# Patient Record
Sex: MALE | Race: WHITE | NOT HISPANIC OR LATINO | Employment: FULL TIME | URBAN - METROPOLITAN AREA
[De-identification: names, ages, dates, MRNs, and addresses within clinical notes are randomized per-mention and may not be internally consistent; named-entity substitution may affect disease eponyms.]

---

## 2024-01-16 NOTE — PROGRESS NOTES
Assessment & Plan   Problem List Items Addressed This Visit    None  Visit Diagnoses       Atypical chest pain    -  Primary    Relevant Orders    Lipid Profile (Chol, Trig, HDL, LDL calc)    EKG 12-lead complete w/read - (Clinic Performed)    Magnesium    NM MPI Treadmill    Screening for hyperlipidemia        Relevant Orders    Lipid Profile (Chol, Trig, HDL, LDL calc)    2nd degree AV block        Relevant Orders    EKG 12-lead complete w/read - (Clinic Performed)             Review of prior external note(s) from - Outside records from [facility]  30 minutes spent by me on the date of the encounter doing chart review, review of outside records, review of test results, interpretation of tests, patient visit, and documentation            Subjective   Blade is a 38 year old, presenting for the following health issues:  Establish Care      HPI     Blade is a 38-year-old healthy male who presents today due to chest pain.  He states that on New Year's naresh he had to get under his home because a pipe broke.  With fixing this he had to reinsulate with foam and upon reaching with his left arm extended upward he had some brief discomfort.  The day following he also noted some left-sided chest pain as he did in subsequent days.  He does report some positional component to this however states that the discomfort does tend to come and go.  The discomfort waxes and wanes and sometimes is more severe than others.  He denies any ability to reproduce this with exertion.  He denies any shortness of breath.  He denies any diaphoresis and/or palpitations associated with this.  He denies any history of smoking.  He states he has not had his cholesterol checked in the past that he is aware of.  He does report a significant history of coronary disease in his father and maternal grandmother.    He was seen at [facility] for this.  At that time they did do labs including troponin, D-dimer, CBC and a BMP all of which were normal.  He did  have a EKG done which reportedly was read out as second-degree AV block with a 2-1 conduction.  However in my review did not appear to be the case.  EKG is normal sinus rhythm today.    Establish Care   Previous PCP:  [facility]     Urgent Care Visit on 1/9/24- [facility] (costochondritis, chest wall pain)      Review of Systems   Constitutional, HEENT, cardiovascular, pulmonary, GI, , musculoskeletal, neuro, skin, endocrine and psych systems are negative, except as otherwise noted.      Objective    /86 (BP Location: Left arm, Patient Position: Sitting, Cuff Size: Adult Large)   Pulse 65   Temp 97.2  F (36.2  C) (Tympanic)   Resp 20   Wt 99.8 kg (220 lb)   SpO2 98%   There is no height or weight on file to calculate BMI.  Physical Exam   GENERAL: alert and no distress  EYES: Eyes grossly normal to inspection, PERRL and conjunctivae and sclerae normal  RESP: lungs clear to auscultation - no rales, rhonchi or wheezes  CV: regular rate and rhythm, normal S1 S2, no S3 or S4, no murmur, click or rub, no peripheral edema  MS: no gross musculoskeletal defects noted, no edema  PSYCH: mentation appears normal, affect normal/bright    EKG - Reviewed and interpreted by me appears normal, NSR, normal axis, normal intervals, no acute ST/T changes c/w ischemia, no LVH by voltage criteria  No results found for any previous visit.     No results found for any visits on 01/17/24.  No results found for this or any previous visit (from the past 24 hour(s)).

## 2024-01-17 ENCOUNTER — OFFICE VISIT (OUTPATIENT)
Dept: INTERNAL MEDICINE | Facility: OTHER | Age: 39
End: 2024-01-17
Attending: INTERNAL MEDICINE
Payer: COMMERCIAL

## 2024-01-17 VITALS
HEART RATE: 65 BPM | OXYGEN SATURATION: 98 % | WEIGHT: 220 LBS | SYSTOLIC BLOOD PRESSURE: 134 MMHG | TEMPERATURE: 97.2 F | DIASTOLIC BLOOD PRESSURE: 86 MMHG | RESPIRATION RATE: 20 BRPM

## 2024-01-17 DIAGNOSIS — I44.1 2ND DEGREE AV BLOCK: ICD-10-CM

## 2024-01-17 DIAGNOSIS — R07.89 ATYPICAL CHEST PAIN: Primary | ICD-10-CM

## 2024-01-17 DIAGNOSIS — Z13.220 SCREENING FOR HYPERLIPIDEMIA: ICD-10-CM

## 2024-01-17 LAB
ATRIAL RATE - MUSE: 63 BPM
CHOLEST SERPL-MCNC: 210 MG/DL
DIASTOLIC BLOOD PRESSURE - MUSE: NORMAL MMHG
FASTING STATUS PATIENT QL REPORTED: YES
HDLC SERPL-MCNC: 52 MG/DL
HOLD SPECIMEN: NORMAL
INTERPRETATION ECG - MUSE: NORMAL
LDLC SERPL CALC-MCNC: 132 MG/DL
MAGNESIUM SERPL-MCNC: 2.1 MG/DL (ref 1.7–2.3)
NONHDLC SERPL-MCNC: 158 MG/DL
P AXIS - MUSE: 61 DEGREES
PR INTERVAL - MUSE: 144 MS
QRS DURATION - MUSE: 98 MS
QT - MUSE: 406 MS
QTC - MUSE: 415 MS
R AXIS - MUSE: 85 DEGREES
SYSTOLIC BLOOD PRESSURE - MUSE: NORMAL MMHG
T AXIS - MUSE: 49 DEGREES
TRIGL SERPL-MCNC: 131 MG/DL
VENTRICULAR RATE- MUSE: 63 BPM

## 2024-01-17 PROCEDURE — 36415 COLL VENOUS BLD VENIPUNCTURE: CPT | Performed by: INTERNAL MEDICINE

## 2024-01-17 PROCEDURE — 83735 ASSAY OF MAGNESIUM: CPT | Performed by: INTERNAL MEDICINE

## 2024-01-17 PROCEDURE — 99204 OFFICE O/P NEW MOD 45 MIN: CPT | Performed by: INTERNAL MEDICINE

## 2024-01-17 PROCEDURE — 80061 LIPID PANEL: CPT | Performed by: INTERNAL MEDICINE

## 2024-01-17 PROCEDURE — 93000 ELECTROCARDIOGRAM COMPLETE: CPT | Mod: 77 | Performed by: INTERNAL MEDICINE

## 2024-01-17 ASSESSMENT — PAIN SCALES - GENERAL: PAINLEVEL: MILD PAIN (2)

## 2024-01-24 ENCOUNTER — TELEPHONE (OUTPATIENT)
Dept: NUCLEAR MEDICINE | Facility: HOSPITAL | Age: 39
End: 2024-01-24

## 2024-01-24 NOTE — TELEPHONE ENCOUNTER
Made an appointment reminder call regarding NM treadmill stress test scheduled on 1/26/24 at 630. No meds listed in chart. Left phone number to call to discuss prep for the test.

## 2024-01-25 ENCOUNTER — TELEPHONE (OUTPATIENT)
Dept: NUCLEAR MEDICINE | Facility: HOSPITAL | Age: 39
End: 2024-01-25

## 2024-01-25 NOTE — TELEPHONE ENCOUNTER
Patient returned phone call regarding prep for NM Treadmill test scheduled 1/26 at 630. Reminded pt no caffeine after 6pm and no food after midnight. Test duration about 4 hours.

## 2024-01-26 ENCOUNTER — HOSPITAL ENCOUNTER (OUTPATIENT)
Dept: NUCLEAR MEDICINE | Facility: HOSPITAL | Age: 39
Setting detail: NUCLEAR MEDICINE
Discharge: HOME | End: 2024-01-26
Attending: INTERNAL MEDICINE
Payer: COMMERCIAL

## 2024-01-26 ENCOUNTER — HOSPITAL ENCOUNTER (OUTPATIENT)
Dept: CARDIOLOGY | Facility: HOSPITAL | Age: 39
Setting detail: NUCLEAR MEDICINE
Discharge: HOME | End: 2024-01-26
Attending: INTERNAL MEDICINE
Payer: COMMERCIAL

## 2024-01-26 DIAGNOSIS — R07.89 ATYPICAL CHEST PAIN: ICD-10-CM

## 2024-01-26 PROCEDURE — A9500 TC99M SESTAMIBI: HCPCS | Performed by: RADIOLOGY

## 2024-01-26 PROCEDURE — 93018 CV STRESS TEST I&R ONLY: CPT | Performed by: INTERNAL MEDICINE

## 2024-01-26 PROCEDURE — 93017 CV STRESS TEST TRACING ONLY: CPT

## 2024-01-26 PROCEDURE — 78452 HT MUSCLE IMAGE SPECT MULT: CPT

## 2024-01-26 PROCEDURE — 258N000003 HC RX IP 258 OP 636: Performed by: INTERNAL MEDICINE

## 2024-01-26 PROCEDURE — 93016 CV STRESS TEST SUPVJ ONLY: CPT | Performed by: INTERNAL MEDICINE

## 2024-01-26 PROCEDURE — 343N000001 HC RX 343: Performed by: RADIOLOGY

## 2024-01-26 RX ORDER — SODIUM CHLORIDE 9 MG/ML
INJECTION, SOLUTION INTRAVENOUS ONCE
Status: COMPLETED | OUTPATIENT
Start: 2024-01-26 | End: 2024-01-26

## 2024-01-26 RX ADMIN — Medication 10.8 MILLICURIE: at 06:44

## 2024-01-26 RX ADMIN — Medication 31.5 MILLICURIE: at 08:47

## 2024-01-26 RX ADMIN — SODIUM CHLORIDE: 9 INJECTION, SOLUTION INTRAVENOUS at 08:29

## 2024-01-29 LAB
CV BLOOD PRESSURE: 69 MMHG
CV STRESS MAX HR HE: 164
NUC STRESS EJECTION FRACTION: 63 %
RATE PRESSURE PRODUCT: NORMAL
STRESS ECHO BASELINE DIASTOLIC HE: 82
STRESS ECHO BASELINE HR: 61 BPM
STRESS ECHO BASELINE SYSTOLIC BP: 132
STRESS ECHO CALCULATED PERCENT HR: 91 %
STRESS ECHO LAST STRESS DIASTOLIC BP: 90
STRESS ECHO LAST STRESS SYSTOLIC BP: 220
STRESS ECHO POST ESTIMATED WORKLOAD: 12.1 METS
STRESS ECHO POST EXERCISE DUR MIN: 11 MIN
STRESS ECHO POST EXERCISE DUR SEC: 30 SEC
STRESS ECHO TARGET HR: 180

## 2024-01-30 ENCOUNTER — TELEPHONE (OUTPATIENT)
Dept: INTERNAL MEDICINE | Facility: OTHER | Age: 39
End: 2024-01-30

## 2024-01-30 NOTE — TELEPHONE ENCOUNTER
10:01 AM    Reason for Call: Phone Call    Description: Patient called in returning Allyson's phone call. Please call patient back    Was an appointment offered for this call? No  If yes : Appointment type              Date    Preferred method for responding to this message: Telephone Call  What is your phone number ? [phone]     If we cannot reach you directly, may we leave a detailed response at the number you provided? Yes Patient will be by phone    Can this message wait until your PCP/provider returns, if available today? Not applicable, PROVIDER IN CLINIC    Cyndie Langston

## 2024-02-17 ENCOUNTER — HEALTH MAINTENANCE LETTER (OUTPATIENT)
Age: 39
End: 2024-02-17

## 2025-03-09 ENCOUNTER — HEALTH MAINTENANCE LETTER (OUTPATIENT)
Age: 40
End: 2025-03-09